# Patient Record
(demographics unavailable — no encounter records)

---

## 2024-12-31 NOTE — ASSESSMENT
[FreeTextEntry1] : Needs MRI to r/o MMT, loose body
What Is The Reason For Today's Visit?: Skin Lesion
Additional History: Here for skin check, growth on the left posterior leg, present for 1 year.

## 2024-12-31 NOTE — HISTORY OF PRESENT ILLNESS
[de-identified] : 12/31/2024: 61 M here for right knee pain that began 11/24 without inciting injury. Pain is intermittent, worse with activity. This is his first evaluation. He has used ice and Meloxicam to treat it. Denies prior injury, locking, buckling.    01/29/2024: Pt reports intermittent pain over the medial aspect of the knee. Reports he has a meiscus tear was seen by [facility] about 15 yrs ago.  recent exacerbation over the last 3 weeks with medially based pain and swelling  works a desk position  [FreeTextEntry1] : Rt knee  [FreeTextEntry5] : Rt knee pain developed a few months ago

## 2024-12-31 NOTE — IMAGING
[Right] : right knee [AP] : anteroposterior [Lateral] : lateral [FreeTextEntry9] : ?osteochondral loose body medial femoral condyle?

## 2025-01-07 NOTE — ASSESSMENT
[FreeTextEntry1] : MRI reviewed, no surgery necessary Will give CSI and see how he does Possibility of viscoinjections in future discussed

## 2025-01-07 NOTE — HISTORY OF PRESENT ILLNESS
[de-identified] : 01/07/2025 Had MRI: no meniscal tears, has tricompartmental OA most notably patellofemoral 12/31/2024: 61 M here for right knee pain that began 11/24 without inciting injury. Pain is intermittent, worse with activity. This is his first evaluation. He has used ice and Meloxicam to treat it. Denies prior injury, locking, buckling.    01/29/2024: Pt reports intermittent pain over the medial aspect of the knee. Reports he has a meiscus tear was seen by [facility] about 15 yrs ago.  recent exacerbation over the last 3 weeks with medially based pain and swelling  works a desk position  [FreeTextEntry1] : Rt knee  [FreeTextEntry5] : Rt knee pain developed a few months ago